# Patient Record
Sex: MALE | Race: WHITE
[De-identification: names, ages, dates, MRNs, and addresses within clinical notes are randomized per-mention and may not be internally consistent; named-entity substitution may affect disease eponyms.]

---

## 2019-06-09 ENCOUNTER — HOSPITAL ENCOUNTER (EMERGENCY)
Dept: HOSPITAL 47 - EC | Age: 64
Discharge: LEFT BEFORE DISCHARGE | End: 2019-06-09
Payer: COMMERCIAL

## 2019-06-09 VITALS — HEART RATE: 101 BPM | RESPIRATION RATE: 20 BRPM

## 2019-06-09 VITALS — SYSTOLIC BLOOD PRESSURE: 136 MMHG | DIASTOLIC BLOOD PRESSURE: 75 MMHG

## 2019-06-09 VITALS — TEMPERATURE: 98.3 F

## 2019-06-09 DIAGNOSIS — R00.2: ICD-10-CM

## 2019-06-09 DIAGNOSIS — R07.2: Primary | ICD-10-CM

## 2019-06-09 DIAGNOSIS — R79.89: ICD-10-CM

## 2019-06-09 DIAGNOSIS — F17.210: ICD-10-CM

## 2019-06-09 DIAGNOSIS — R05: ICD-10-CM

## 2019-06-09 DIAGNOSIS — R06.00: ICD-10-CM

## 2019-06-09 LAB
ALBUMIN SERPL-MCNC: 4.3 G/DL (ref 3.5–5)
ALP SERPL-CCNC: 79 U/L (ref 38–126)
ALT SERPL-CCNC: 41 U/L (ref 21–72)
ANION GAP SERPL CALC-SCNC: 13 MMOL/L
APTT BLD: 25.5 SEC (ref 22–30)
AST SERPL-CCNC: 84 U/L (ref 17–59)
BASOPHILS # BLD AUTO: 0 K/UL (ref 0–0.2)
BASOPHILS NFR BLD AUTO: 0 %
BUN SERPL-SCNC: 9 MG/DL (ref 9–20)
CALCIUM SPEC-MCNC: 9.9 MG/DL (ref 8.4–10.2)
CHLORIDE SERPL-SCNC: 99 MMOL/L (ref 98–107)
CO2 SERPL-SCNC: 26 MMOL/L (ref 22–30)
EOSINOPHIL # BLD AUTO: 0.2 K/UL (ref 0–0.7)
EOSINOPHIL NFR BLD AUTO: 1 %
ERYTHROCYTE [DISTWIDTH] IN BLOOD BY AUTOMATED COUNT: 4.65 M/UL (ref 4.3–5.9)
ERYTHROCYTE [DISTWIDTH] IN BLOOD: 17.2 % (ref 11.5–15.5)
GLUCOSE SERPL-MCNC: 82 MG/DL (ref 74–99)
HCT VFR BLD AUTO: 41.4 % (ref 39–53)
HGB BLD-MCNC: 13.8 GM/DL (ref 13–17.5)
INR PPP: 0.9 (ref ?–1.2)
LYMPHOCYTES # SPEC AUTO: 2.2 K/UL (ref 1–4.8)
LYMPHOCYTES NFR SPEC AUTO: 14 %
MAGNESIUM SPEC-SCNC: 2.4 MG/DL (ref 1.6–2.3)
MCH RBC QN AUTO: 29.6 PG (ref 25–35)
MCHC RBC AUTO-ENTMCNC: 33.3 G/DL (ref 31–37)
MCV RBC AUTO: 88.9 FL (ref 80–100)
MONOCYTES # BLD AUTO: 0.8 K/UL (ref 0–1)
MONOCYTES NFR BLD AUTO: 5 %
NEUTROPHILS # BLD AUTO: 12.9 K/UL (ref 1.3–7.7)
NEUTROPHILS NFR BLD AUTO: 79 %
PLATELET # BLD AUTO: 178 K/UL (ref 150–450)
POTASSIUM SERPL-SCNC: 3.4 MMOL/L (ref 3.5–5.1)
PROT SERPL-MCNC: 7.1 G/DL (ref 6.3–8.2)
PT BLD: 10 SEC (ref 9–12)
SODIUM SERPL-SCNC: 138 MMOL/L (ref 137–145)
WBC # BLD AUTO: 16.4 K/UL (ref 3.8–10.6)

## 2019-06-09 PROCEDURE — 85730 THROMBOPLASTIN TIME PARTIAL: CPT

## 2019-06-09 PROCEDURE — 84484 ASSAY OF TROPONIN QUANT: CPT

## 2019-06-09 PROCEDURE — 85025 COMPLETE CBC W/AUTO DIFF WBC: CPT

## 2019-06-09 PROCEDURE — 93005 ELECTROCARDIOGRAM TRACING: CPT

## 2019-06-09 PROCEDURE — 83735 ASSAY OF MAGNESIUM: CPT

## 2019-06-09 PROCEDURE — 85610 PROTHROMBIN TIME: CPT

## 2019-06-09 PROCEDURE — 80053 COMPREHEN METABOLIC PANEL: CPT

## 2019-06-09 PROCEDURE — 99285 EMERGENCY DEPT VISIT HI MDM: CPT

## 2019-06-09 PROCEDURE — 71046 X-RAY EXAM CHEST 2 VIEWS: CPT

## 2019-06-09 PROCEDURE — 83880 ASSAY OF NATRIURETIC PEPTIDE: CPT

## 2019-06-09 PROCEDURE — 36415 COLL VENOUS BLD VENIPUNCTURE: CPT

## 2019-06-09 NOTE — XR
EXAM:

  XR Chest, 2 Views

 

CLINICAL HISTORY:

  ITS.REASON XR Reason: Chest Pain

 

TECHNIQUE:

  Frontal and lateral views of the chest.

 

COMPARISON:

  None.

 

FINDINGS:

  Lungs:  Unremarkable.  No consolidation.

  Pleural space:  Unremarkable.  No pneumothorax.

  Heart:  Unremarkable.  No cardiomegaly.

  Mediastinum:  Unremarkable.

  Bones/joints:  Compression deformities of lower thoracic vertebral 

bodies.  Chronic appearing right lateral rib fractures.

 

IMPRESSION:     

1.  No acute cardiopulmonary abnormality.

2.  Compression deformities of lower thoracic vertebral bodies.  

Recommend correlation for point tenderness.  Consider evaluation with MRI 

as clinically warranted.

## 2019-06-09 NOTE — ED
Chest Pain HPI





- General


Chief Complaint: Chest Pain


Stated Complaint: Chest Pain


Source: patient, EMS


Mode of arrival: EMS


Limitations: no limitations





- History of Present Illness


Initial Comments: 


Socrates is a 64-year-old male with history of alcohol abuse, hypertension him a 

cigarette smoking, chronic bronchitis who presents the emergency department 

today with multiple complaints.  Patient reports that she's been feeling very 

anxious having a lot of palpitations.  Patient reports that he can having some 

left-sided chest pain and discomfort.  He contacted EMS.  Patient describes the 

pain is in his left chest sharp worse with coughing or deep inspiration.  

Patient reports he's been seen in the ER before for chest pain but doesn't 

follow with cardiologist.





MD Complaint: chest pain


-: hour(s)


Onset: during rest


Pain Location: substernal, left chest


Pain Radiation: LUE


Severity: moderate


Quality: sharp


Consistency: constant


Improves With: nothing


Worsens With: other (cough)


Anginal Symptoms: dyspnea


Other Symptoms: fever (subjective), palpitations


Treatments Prior to Arrival: aspirin, oxygen





- Related Data


                                    Allergies











Allergy/AdvReac Type Severity Reaction Status Date / Time


 


No Known Allergies Allergy   Verified 06/09/19 05:17














Review of Systems


ROS Statement: 


Those systems with pertinent positive or pertinent negative responses have been 

documented in the HPI.





ROS Other: All systems not noted in ROS Statement are negative.





EKG Findings





- EKG Comments:


EKG Findings:: EKG was obtained due to his complaint of chest pain and EKG 

obtained at 5:14 AM, rate as well for rhythm is sinus tachycardia there is a 

normal axis there are normal intervals, NJ 1:30, QRS 76, QTC is 491 and no acute

ST elevations, mild ST depressions in the lateral leads no evidence of ischemic 

acute infarction.





General Exam





- General Exam Comments


Initial Comments: 


Physical Exam


GENERAL:


anxious appearing


Unkempt appearance





HENT:


Normocephalic, Atraumatic. 





EYES:


PERRL, EOMI





PULMONARY:


Unlabored respirations.  


No audible rales rhonchi or wheezing was noted.





CARDIOVASCULAR:


There is a regular rate and rhythm without any murmurs gallops or rubs.  





ABDOMEN:


Soft and nontender with normal bowel sounds. 





SKIN:


Skin is clear with no lesions or rashes and otherwise unremarkable.


Ankle changes on hands consistent with cigarette smoking





: 


Deferred





NEUROLOGIC:


Patient is alert and oriented x3.  Moving all extremities spontaneously





MUSCULOSKELETAL:


Normal extremities with adequate strength and full range of motion.  No lower 

extremity swelling or edema.  No calf tenderness.  





PSYCHIATRIC:


Anxious


Limitations: no limitations





Course


                                   Vital Signs











  06/09/19 06/09/19 06/09/19





  04:58 05:08 05:10


 


Temperature 98.3 F  


 


Pulse Rate 103 H 107 H 104 H


 


Respiratory 14 20 20





Rate   


 


Blood Pressure 136/82 132/82 132/82


 


O2 Sat by Pulse 99  





Oximetry   














  06/09/19 06/09/19 06/09/19





  05:20 05:30 05:40


 


Temperature   


 


Pulse Rate 109 H  105 H


 


Respiratory 15  15





Rate   


 


Blood Pressure 132/82 132/82 132/82


 


O2 Sat by Pulse   





Oximetry   














  06/09/19 06/09/19 06/09/19





  05:50 06:00 06:10


 


Temperature   


 


Pulse Rate 103 H 99 101 H


 


Respiratory 19 20 21





Rate   


 


Blood Pressure 132/82 132/82 120/81


 


O2 Sat by Pulse   





Oximetry   














  06/09/19





  06:20


 


Temperature 


 


Pulse Rate 101 H


 


Respiratory 20





Rate 


 


Blood Pressure 120/81


 


O2 Sat by Pulse 





Oximetry 














Chest Pain MDM





- MDM


The patient was seen and evaluated history was obtained from the patient and EMS


64-year-old gentleman history of hypertension, smoking, alcohol abuse presenting

with left-sided chest pain.


workup was initiated





Labs with elevated Trop - Heparin and Nitrio were ordered


Elevated mag





EKG no infarction


CXR unremarkable





Patient care was discussed with Dr. Peck who accepted admission, however 

patient then decided to leave. 





AGAINST MEDICAL ADVICE





The patient has decided to leave against medical advice because he reports he 

has things to do and doesnt want to stay


The patient has adequate capacity to make medical decisions.


The patient refuses hospital admission and wants to be discharged.


The risks have been explained to the patient, including worsening illness, 

permanent disability and death or MASSIVE HEART ATTACK


The benefits of workup/admission have also been explained, including the 

availability and proximity of nurses, physicians, monitoring, diagnostic 

testing, treatment and further evaluation by cardiology


The patient was able to understand and state the risks and benefits of hospital 

admission. 


The patient the opportunity to ask questions about their medical condition.


The patient was treated to the extent that they would allow and knows that they 

may return for care at any time.








Critical Care Time


Critical Care Time: Yes


Total Critical Care Time: 30


Critical Care Time: 





Critical Care Time 


Critical care time was exclusive of separately billable procedures and treating 

other patients and teaching time. 


Critical care was necessary to treat or prevent imminent or life-threatening 

deterioration. 


Given the critical condition in which the patient arrived, the patient was 

immediately assessed by myself and the nurse, and cardiac monitoring initiated 

due to the potential for rapid decompensation of the patient's clinical 

condition. During the course of the patients stay, I spent a considerable amount

of time at the bedside performing serial re-evaluations of the patient's 

hemodynamic and clinical status because of the recognized potential threat to 

life or limb in this condition. I then had a chance to review not only all of t

he available current laboratory and radiographic studies obtained today, but I 

also reviewed old records available to me at the time. Additionally, any 

ancillary information available including paramedic records were reviewed. 

Sequential vital signs were obtained. 





Disposition


Clinical Impression: 


 Chest pain, Elevated troponin





Disposition: Left Against Medical Advice


Condition: Stable


Is patient prescribed a controlled substance at d/c from ED?: No


Referrals: 


None,Stated [Primary Care Provider] - 1-2 days

## 2021-03-23 ENCOUNTER — HOSPITAL ENCOUNTER (OUTPATIENT)
Dept: HOSPITAL 47 - RADXRMAIN | Age: 66
Discharge: HOME | End: 2021-03-23
Attending: FAMILY MEDICINE
Payer: COMMERCIAL

## 2021-03-23 DIAGNOSIS — M47.26: ICD-10-CM

## 2021-03-23 DIAGNOSIS — M51.16: Primary | ICD-10-CM

## 2021-03-23 PROCEDURE — 72110 X-RAY EXAM L-2 SPINE 4/>VWS: CPT

## 2021-03-23 NOTE — XR
Lumbar sacral spine

 

HISTORY: Low back pain, sciatic pain

 

5 views of lumbosacral spine, no comparisons

 

There is a mild spinal curvature. There is multilevel spondylosis. Lumbar vertebral bodies show prese
rved height and bone mineralization. Sclerosis present in the posterior elements. Loss of disc height
 is present at intervertebral levels, vacuum phenomenon present at the lower lumbar levels, suspect t
here may be transitional vertebral body or T12 without ribs. Atherosclerotic vascular calcifications 
are noted in the aortoiliac distribution, there is no evident spondylolysis.

 

IMPRESSION: Degenerative disc disease and facet arthropathy. Additional findings above.

## 2022-04-17 ENCOUNTER — HOSPITAL ENCOUNTER (EMERGENCY)
Dept: HOSPITAL 47 - EC | Age: 67
Discharge: HOME | End: 2022-04-17
Payer: COMMERCIAL

## 2022-04-17 ENCOUNTER — HOSPITAL ENCOUNTER (EMERGENCY)
Dept: HOSPITAL 47 - EC | Age: 67
Discharge: HOME | End: 2022-04-17
Payer: MEDICARE

## 2022-04-17 VITALS — RESPIRATION RATE: 18 BRPM | TEMPERATURE: 98 F

## 2022-04-17 VITALS
SYSTOLIC BLOOD PRESSURE: 160 MMHG | HEART RATE: 90 BPM | RESPIRATION RATE: 18 BRPM | TEMPERATURE: 97.6 F | DIASTOLIC BLOOD PRESSURE: 99 MMHG

## 2022-04-17 VITALS — SYSTOLIC BLOOD PRESSURE: 102 MMHG | HEART RATE: 101 BPM | DIASTOLIC BLOOD PRESSURE: 79 MMHG

## 2022-04-17 DIAGNOSIS — G89.29: ICD-10-CM

## 2022-04-17 DIAGNOSIS — M54.9: ICD-10-CM

## 2022-04-17 DIAGNOSIS — F14.90: ICD-10-CM

## 2022-04-17 DIAGNOSIS — G89.29: Primary | ICD-10-CM

## 2022-04-17 DIAGNOSIS — F17.200: ICD-10-CM

## 2022-04-17 DIAGNOSIS — M54.9: Primary | ICD-10-CM

## 2022-04-17 DIAGNOSIS — F10.929: ICD-10-CM
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
## 2022-04-17 PROCEDURE — 99284 EMERGENCY DEPT VISIT MOD MDM: CPT

## 2022-04-17 PROCEDURE — 96372 THER/PROPH/DIAG INJ SC/IM: CPT

## 2022-04-17 PROCEDURE — 99283 EMERGENCY DEPT VISIT LOW MDM: CPT

## 2022-04-17 NOTE — ED
General Adult HPI





- General


Chief complaint: Recheck/Abnormal Lab/Rx


Stated complaint: ETOH


Time Seen by Provider: 04/17/22 06:39


Source: patient, EMS, RN notes reviewed


Mode of arrival: EMS


Limitations: no limitations





- History of Present Illness


Initial comments: 


This a 67-year-old male presents emergency department for possible back pain.  

Patient seen here earlier patient states he decided to leave prior to being 

discharged attempted to walk home but was unable to complete the walk.  Patient 

stopped at work.  Office for help.  Patient was brought back to emergency 

department.  Patient denies any acute changes.  States is chronic back pain 

denies any bowel, bladder incontinence or retention.  He does admit that he does

abuse alcohol but he is currently soap.  Patient denies being distracted no 

headache no dizziness no chest pain or shortness breath or focal weakness.  

Patient states he feels much better at this time.








- Related Data


                                  Previous Rx's











 Medication  Instructions  Recorded


 


diazePAM [Valium] 5 mg PO TID PRN 3 Days #9 tab 02/21/22











                                    Allergies











Allergy/AdvReac Type Severity Reaction Status Date / Time


 


No Known Allergies Allergy   Verified 04/17/22 06:47














Review of Systems


ROS Statement: 


Those systems with pertinent positive or pertinent negative responses have been 

documented in the HPI.





ROS Other: All systems not noted in ROS Statement are negative.





Past Medical History


Past Medical History: Unable to Obtain


Past Surgical History: Unable to Obtain


Smoking Status: Current some day smoker


Past Alcohol Use History: Heavy


Past Drug Use History: Cocaine





General Exam


Limitations: no limitations


General appearance: alert, in no apparent distress


Head exam: Present: atraumatic, normocephalic, normal inspection


Eye exam: Present: normal appearance, PERRL, EOMI.  Absent: scleral icterus, 

conjunctival injection, periorbital swelling


ENT exam: Present: normal exam, normal oropharynx, mucous membranes moist


Neck exam: Present: normal inspection, full ROM.  Absent: tenderness, 

meningismus, lymphadenopathy


Respiratory exam: Present: normal lung sounds bilaterally.  Absent: respiratory 

distress, wheezes, rales, rhonchi, stridor


Cardiovascular Exam: Present: regular rate, normal rhythm, normal heart sounds. 

 Absent: systolic murmur, diastolic murmur, rubs, gallop, clicks


GI/Abdominal exam: Present: soft, normal bowel sounds.  Absent: distended, 

tenderness, guarding, rebound, rigid


Extremities exam: Present: other (Lower extremity strength equal bilaterally, 

neurovascular intact patient is able to ambulate with no difficulty.)


Back exam: Present: full ROM, tenderness, paraspinal tenderness.  Absent: 

vertebral tenderness


Neurological exam: Present: alert, oriented X3, CN II-XII intact, reflexes 

normal.  Absent: motor sensory deficit





Course


                                   Vital Signs











  04/17/22





  06:42


 


Temperature 97.6 F


 


Pulse Rate 90


 


Respiratory 18





Rate 


 


Blood Pressure 160/99


 


O2 Sat by Pulse 99





Oximetry 














Medical Decision Making





- Medical Decision Making





Patient is an ongoing alcohol use, abuse, patient is chronic back pain and has 

no red flag symptoms.  Patient is able to ambulate without difficulty.  

Patient's AAO4.  Patient be discharged to [facility] to his home.





Disposition


Clinical Impression: 


 Chronic back pain, Alcohol use





Disposition: HOME SELF-CARE


Condition: Stable


Instructions (If sedation given, give patient instructions):  Back Pain (ED)


Additional Instructions: 


Please return to the Emergency Department if symptoms worsen or any other 

concerns.


Is patient prescribed a controlled substance at d/c from ED?: No


Referrals: 


None,Stated [Primary Care Provider] - 1-2 days


Time of Disposition: 06:49

## 2022-04-17 NOTE — ED
General Adult HPI





- General


Chief complaint: Back Pain/Injury


Stated complaint: Back Pain


Time Seen by Provider: 04/17/22 02:17


Source: patient, EMS, RN notes reviewed, old records reviewed





- History of Present Illness


Initial comments: 





67-year-old male presents for evaluation of back pain, states this is chronic in

nature.  Denies any new injuries.  Patient ambulatory in the emergency 

department.  Denies bowel or bladder incontinence.  Denies numbness or tingling 

to the legs.  He states the pain is chronic and he is seen back specialist out 

of [facility].  He does admit to alcohol consumption today.  He is 

requesting a pain shot for his chronic symptoms.





- Related Data


                                  Previous Rx's











 Medication  Instructions  Recorded


 


diazePAM [Valium] 5 mg PO TID PRN 3 Days #9 tab 02/21/22











                                    Allergies











Allergy/AdvReac Type Severity Reaction Status Date / Time


 


No Known Allergies Allergy   Verified 06/09/19 05:17














Review of Systems


ROS Statement: 


Those systems with pertinent positive or pertinent negative responses have been 

documented in the HPI.





ROS Other: All systems not noted in ROS Statement are negative.





Past Medical History


Past Medical History: Unable to Obtain


Past Surgical History: Unable to Obtain


Smoking Status: Current some day smoker


Past Alcohol Use History: Heavy


Past Drug Use History: Cocaine





General Exam


General appearance: alert, in no apparent distress


Head exam: Present: atraumatic, normocephalic


Eye exam: Present: normal appearance, PERRL


ENT exam: Present: normal exam


Neck exam: Present: normal inspection.  Absent: tenderness, meningismus


Respiratory exam: Present: normal lung sounds bilaterally.  Absent: respiratory 

distress


Cardiovascular Exam: Present: regular rate, normal rhythm


GI/Abdominal exam: Present: soft.  Absent: distended, tenderness


Extremities exam: Present: normal inspection, normal capillary refill.  Absent: 

pedal edema, calf tenderness


Back exam: Present: normal inspection.  Absent: vertebral tenderness


Neurological exam: Present: alert, oriented X3, CN II-XII intact.  Absent: motor

 sensory deficit


Psychiatric exam: Present: agitated


Skin exam: Present: warm, dry, intact.  Absent: cyanosis, diaphoretic





Course


                                   Vital Signs











  04/17/22 04/17/22





  01:26 04:35


 


Temperature 98.0 F 


 


Pulse Rate 86 101 H


 


Respiratory 18 18





Rate  


 


Blood Pressure 151/99 102/79


 


O2 Sat by Pulse 96 95





Oximetry  














- Reevaluation(s)


Reevaluation #1: 





04/17/22 05:30


Patient feeling better and eager for discharge.





Medical Decision Making





- Medical Decision Making





67-year-old male with chronic back pain.  Patient ambulatory with a nonfocal 

neurologic exam.  He is somewhat intoxicated upon arrival.  He is observed in 

the emergency department awaiting sobriety and ultimately discharged in stable 

condition.





Disposition


Clinical Impression: 


 Chronic back pain





Disposition: HOME SELF-CARE


Condition: Fair


Instructions (If sedation given, give patient instructions):  Chronic Back Pain 

(DC)


Is patient prescribed a controlled substance at d/c from ED?: No


Referrals: 


None,Stated [Primary Care Provider] - 1-2 days


Time of Disposition: 05:35

## 2022-04-18 ENCOUNTER — HOSPITAL ENCOUNTER (EMERGENCY)
Dept: HOSPITAL 47 - EC | Age: 67
LOS: 1 days | Discharge: HOME | End: 2022-04-19
Payer: MEDICARE

## 2022-04-18 VITALS — TEMPERATURE: 98 F

## 2022-04-18 DIAGNOSIS — F17.200: ICD-10-CM

## 2022-04-18 DIAGNOSIS — R07.89: Primary | ICD-10-CM

## 2022-04-18 PROCEDURE — 93005 ELECTROCARDIOGRAM TRACING: CPT

## 2022-04-18 PROCEDURE — 36415 COLL VENOUS BLD VENIPUNCTURE: CPT

## 2022-04-18 PROCEDURE — 85610 PROTHROMBIN TIME: CPT

## 2022-04-18 PROCEDURE — 83690 ASSAY OF LIPASE: CPT

## 2022-04-18 PROCEDURE — 96361 HYDRATE IV INFUSION ADD-ON: CPT

## 2022-04-18 PROCEDURE — 85025 COMPLETE CBC W/AUTO DIFF WBC: CPT

## 2022-04-18 PROCEDURE — 85730 THROMBOPLASTIN TIME PARTIAL: CPT

## 2022-04-18 PROCEDURE — 71046 X-RAY EXAM CHEST 2 VIEWS: CPT

## 2022-04-18 PROCEDURE — 96374 THER/PROPH/DIAG INJ IV PUSH: CPT

## 2022-04-18 PROCEDURE — 80053 COMPREHEN METABOLIC PANEL: CPT

## 2022-04-18 PROCEDURE — 99285 EMERGENCY DEPT VISIT HI MDM: CPT

## 2022-04-18 PROCEDURE — 84484 ASSAY OF TROPONIN QUANT: CPT

## 2022-04-18 PROCEDURE — 83735 ASSAY OF MAGNESIUM: CPT

## 2022-04-18 PROCEDURE — 82150 ASSAY OF AMYLASE: CPT

## 2022-04-19 VITALS — HEART RATE: 68 BPM | SYSTOLIC BLOOD PRESSURE: 128 MMHG | DIASTOLIC BLOOD PRESSURE: 86 MMHG

## 2022-04-19 VITALS — RESPIRATION RATE: 18 BRPM

## 2022-04-19 LAB
ALBUMIN SERPL-MCNC: 4.5 G/DL (ref 3.5–5)
ALP SERPL-CCNC: 72 U/L (ref 38–126)
ALT SERPL-CCNC: 74 U/L (ref 4–49)
AMYLASE SERPL-CCNC: 37 U/L (ref 30–110)
ANION GAP SERPL CALC-SCNC: 12 MMOL/L
APTT BLD: 25.7 SEC (ref 22–30)
AST SERPL-CCNC: 123 U/L (ref 17–59)
BASOPHILS # BLD AUTO: 0.1 K/UL (ref 0–0.2)
BASOPHILS NFR BLD AUTO: 1 %
BUN SERPL-SCNC: 19 MG/DL (ref 9–20)
CALCIUM SPEC-MCNC: 9.1 MG/DL (ref 8.4–10.2)
CHLORIDE SERPL-SCNC: 95 MMOL/L (ref 98–107)
CO2 SERPL-SCNC: 25 MMOL/L (ref 22–30)
EOSINOPHIL # BLD AUTO: 0.1 K/UL (ref 0–0.7)
EOSINOPHIL NFR BLD AUTO: 1 %
ERYTHROCYTE [DISTWIDTH] IN BLOOD BY AUTOMATED COUNT: 3.97 M/UL (ref 4.3–5.9)
ERYTHROCYTE [DISTWIDTH] IN BLOOD: 13.2 % (ref 11.5–15.5)
GLUCOSE SERPL-MCNC: 107 MG/DL (ref 74–99)
HCT VFR BLD AUTO: 41.3 % (ref 39–53)
HGB BLD-MCNC: 13.7 GM/DL (ref 13–17.5)
INR PPP: 1 (ref ?–1.2)
LIPASE SERPL-CCNC: 188 U/L (ref 23–300)
LYMPHOCYTES # SPEC AUTO: 1.2 K/UL (ref 1–4.8)
LYMPHOCYTES NFR SPEC AUTO: 13 %
MAGNESIUM SPEC-SCNC: 1.8 MG/DL (ref 1.6–2.3)
MCH RBC QN AUTO: 34.4 PG (ref 25–35)
MCHC RBC AUTO-ENTMCNC: 33.1 G/DL (ref 31–37)
MCV RBC AUTO: 103.9 FL (ref 80–100)
MONOCYTES # BLD AUTO: 0.8 K/UL (ref 0–1)
MONOCYTES NFR BLD AUTO: 8 %
NEUTROPHILS # BLD AUTO: 7.1 K/UL (ref 1.3–7.7)
NEUTROPHILS NFR BLD AUTO: 76 %
PLATELET # BLD AUTO: 148 K/UL (ref 150–450)
POTASSIUM SERPL-SCNC: 3.9 MMOL/L (ref 3.5–5.1)
PROT SERPL-MCNC: 7.8 G/DL (ref 6.3–8.2)
PT BLD: 10.5 SEC (ref 9–12)
SODIUM SERPL-SCNC: 132 MMOL/L (ref 137–145)
WBC # BLD AUTO: 9.3 K/UL (ref 3.8–10.6)

## 2022-04-19 NOTE — XR
EXAMINATION TYPE: XR chest 2V

 

DATE OF EXAM: 4/18/2022

 

COMPARISON: 6/9/2019

 

HISTORY: Chest pain

 

TECHNIQUE:

 

FINDINGS: Heart is normal. Lungs are clear of consolidation. There are no hilar masses. There Is prob
ably some COPD. Mediastinum within normal limits. There are chest leads. Bony thorax shows slight ant
erior wedging of mid thoracic vertebra up to 20% with osteoporosis.

 

IMPRESSION: COPD. Osteoporotic type thoracic compression fractures also present on old exam. No acute
 lung disease.

## 2022-04-19 NOTE — ED
General Adult HPI





- General


Chief complaint: Chest Pain


Stated complaint: Chest Pain


Time Seen by Provider: 04/18/22 23:31


Source: patient


Mode of arrival: ambulatory





- History of Present Illness


Initial comments: 





This patient is 67-year-old man who presents to be evaluated for constellation 

of symptoms that he identifies as anxiety attack.  He states that it had started

last night.  He was feeling worse tonight so he called ambulance.  The patient 

states he was somewhat short of breath, there was some chest tightness, and he 

was feeling very anxious. 


-: hour(s)


Location: chest


Radiation: non-radiation


Quality: other


Consistency: constant


Improves with: none


Worsens with: none


Treatments Prior to Arrival: none





- Related Data


                                  Previous Rx's











 Medication  Instructions  Recorded


 


diazePAM [Valium] 5 mg PO TID PRN 3 Days #9 tab 02/21/22


 


LORazepam [Ativan] 1 mg PO TID 3 Days #9 tab 04/19/22











                                    Allergies











Allergy/AdvReac Type Severity Reaction Status Date / Time


 


No Known Allergies Allergy   Verified 04/18/22 23:28














Review of Systems


ROS Statement: 


Those systems with pertinent positive or pertinent negative responses have been 

documented in the HPI.





ROS Other: All systems not noted in ROS Statement are negative.


Constitutional: Denies: fever, chills


Respiratory: Reports: as per HPI, dyspnea.  Denies: cough, wheezes


Cardiovascular: Reports: as per HPI, chest pain.  Denies: palpitations, dyspnea 

on exertion, orthopnea, edema


Gastrointestinal: Denies: abdominal pain, nausea, vomiting


Genitourinary: Denies: dysuria


Musculoskeletal: Denies: back pain


Skin: Denies: rash


Neurological: Denies: headache, weakness


Psychiatric: Reports: anxiety.  Denies: depression, suicidal thoughts





Past Medical History


Past Medical History: Unable to Obtain


Past Surgical History: Unable to Obtain


Smoking Status: Current some day smoker


Past Alcohol Use History: Heavy


Past Drug Use History: Cocaine





General Exam


General appearance: alert, in no apparent distress


Head exam: Present: atraumatic, normocephalic


Eye exam: Present: normal appearance.  Absent: scleral icterus, conjunctival 

injection


Neck exam: Present: normal inspection, full ROM


Respiratory exam: Present: normal lung sounds bilaterally.  Absent: respiratory 

distress, wheezes, rales, rhonchi, stridor


Cardiovascular Exam: Present: regular rate, normal rhythm, normal heart sounds. 

 Absent: systolic murmur, diastolic murmur, rubs, gallop


GI/Abdominal exam: Present: soft.  Absent: distended, tenderness, guarding, re

bound, rigid, mass


Extremities exam: Present: normal inspection, normal capillary refill.  Absent: 

pedal edema, calf tenderness


Back exam: Present: normal inspection.  Absent: CVA tenderness (R), CVA 

tenderness (L)


Neurological exam: Present: alert


Psychiatric exam: Present: anxious.  Absent: homicidal ideation, suicidal 

ideation


Skin exam: Present: warm, dry, intact, normal color.  Absent: rash





Course


                                   Vital Signs











  04/18/22 04/19/22 04/19/22





  23:22 00:30 01:00


 


Temperature 98 F  


 


Pulse Rate 105 H  85


 


Respiratory 16 26 H 25 H





Rate   


 


Blood Pressure 164/116 137/97 142/90


 


O2 Sat by Pulse 99 99 96





Oximetry   














  04/19/22 04/19/22 04/19/22





  01:30 02:30 03:00


 


Temperature   


 


Pulse Rate 89 86 97


 


Respiratory 20 13 18





Rate   


 


Blood Pressure 131/86 141/103 136/86


 


O2 Sat by Pulse 98 97 96





Oximetry   














EKG Findings





- EKG Results:


EKG: sinus rhythm (Rate 88 bpm), normal axis, normal ST/T





- Blocks, Axis, Hypertrophy, ST Abn:


Chamber hypertrophy or enlargement: only voltage criteria for left ventricular 

hypertrophy





Medical Decision Making





- Lab Data


Result diagrams: 


                                 04/18/22 23:36





                                 04/18/22 23:36


                                   Lab Results











  04/18/22 04/18/22 04/18/22 Range/Units





  23:36 23:36 23:36 


 


WBC  9.3    (3.8-10.6)  k/uL


 


RBC  3.97 L    (4.30-5.90)  m/uL


 


Hgb  13.7    (13.0-17.5)  gm/dL


 


Hct  41.3    (39.0-53.0)  %


 


MCV  103.9 H    (80.0-100.0)  fL


 


MCH  34.4    (25.0-35.0)  pg


 


MCHC  33.1    (31.0-37.0)  g/dL


 


RDW  13.2    (11.5-15.5)  %


 


Plt Count  148 L    (150-450)  k/uL


 


MPV  9.0    


 


Neutrophils %  76    %


 


Lymphocytes %  13    %


 


Monocytes %  8    %


 


Eosinophils %  1    %


 


Basophils %  1    %


 


Neutrophils #  7.1    (1.3-7.7)  k/uL


 


Lymphocytes #  1.2    (1.0-4.8)  k/uL


 


Monocytes #  0.8    (0-1.0)  k/uL


 


Eosinophils #  0.1    (0-0.7)  k/uL


 


Basophils #  0.1    (0-0.2)  k/uL


 


Macrocytosis  Slight    


 


PT   10.5   (9.0-12.0)  sec


 


INR   1.0   (<1.2)  


 


APTT   25.7   (22.0-30.0)  sec


 


Sodium    132 L  (137-145)  mmol/L


 


Potassium    3.9  (3.5-5.1)  mmol/L


 


Chloride    95 L  ()  mmol/L


 


Carbon Dioxide    25  (22-30)  mmol/L


 


Anion Gap    12  mmol/L


 


BUN    19  (9-20)  mg/dL


 


Creatinine    0.78  (0.66-1.25)  mg/dL


 


Est GFR (CKD-EPI)AfAm    >90  (>60 ml/min/1.73 sqM)  


 


Est GFR (CKD-EPI)NonAf    >90  (>60 ml/min/1.73 sqM)  


 


Glucose    107 H  (74-99)  mg/dL


 


Calcium    9.1  (8.4-10.2)  mg/dL


 


Magnesium    1.8  (1.6-2.3)  mg/dL


 


Total Bilirubin    1.5 H  (0.2-1.3)  mg/dL


 


AST    123 H  (17-59)  U/L


 


ALT    74 H  (4-49)  U/L


 


Alkaline Phosphatase    72  ()  U/L


 


Troponin I     (0.000-0.034)  ng/mL


 


Total Protein    7.8  (6.3-8.2)  g/dL


 


Albumin    4.5  (3.5-5.0)  g/dL


 


Amylase    37  ()  U/L


 


Lipase    188  ()  U/L














  04/18/22 04/19/22 Range/Units





  23:36 03:06 


 


WBC    (3.8-10.6)  k/uL


 


RBC    (4.30-5.90)  m/uL


 


Hgb    (13.0-17.5)  gm/dL


 


Hct    (39.0-53.0)  %


 


MCV    (80.0-100.0)  fL


 


MCH    (25.0-35.0)  pg


 


MCHC    (31.0-37.0)  g/dL


 


RDW    (11.5-15.5)  %


 


Plt Count    (150-450)  k/uL


 


MPV    


 


Neutrophils %    %


 


Lymphocytes %    %


 


Monocytes %    %


 


Eosinophils %    %


 


Basophils %    %


 


Neutrophils #    (1.3-7.7)  k/uL


 


Lymphocytes #    (1.0-4.8)  k/uL


 


Monocytes #    (0-1.0)  k/uL


 


Eosinophils #    (0-0.7)  k/uL


 


Basophils #    (0-0.2)  k/uL


 


Macrocytosis    


 


PT    (9.0-12.0)  sec


 


INR    (<1.2)  


 


APTT    (22.0-30.0)  sec


 


Sodium    (137-145)  mmol/L


 


Potassium    (3.5-5.1)  mmol/L


 


Chloride    ()  mmol/L


 


Carbon Dioxide    (22-30)  mmol/L


 


Anion Gap    mmol/L


 


BUN    (9-20)  mg/dL


 


Creatinine    (0.66-1.25)  mg/dL


 


Est GFR (CKD-EPI)AfAm    (>60 ml/min/1.73 sqM)  


 


Est GFR (CKD-EPI)NonAf    (>60 ml/min/1.73 sqM)  


 


Glucose    (74-99)  mg/dL


 


Calcium    (8.4-10.2)  mg/dL


 


Magnesium    (1.6-2.3)  mg/dL


 


Total Bilirubin    (0.2-1.3)  mg/dL


 


AST    (17-59)  U/L


 


ALT    (4-49)  U/L


 


Alkaline Phosphatase    ()  U/L


 


Troponin I  0.016  0.015  (0.000-0.034)  ng/mL


 


Total Protein    (6.3-8.2)  g/dL


 


Albumin    (3.5-5.0)  g/dL


 


Amylase    ()  U/L


 


Lipase    ()  U/L














Disposition


Clinical Impression: 


 Chest pain





Disposition: HOME SELF-CARE


Condition: Good


Instructions (If sedation given, give patient instructions):  Chest Pain (ED)


Prescriptions: 


LORazepam [Ativan] 1 mg PO TID 3 Days #9 tab


Is patient prescribed a controlled substance at d/c from ED?: No


Referrals: 


None,Stated [Primary Care Provider] - 1-2 days


Brennon Arevalo MD [STAFF PHYSICIAN] - 1-2 days